# Patient Record
Sex: FEMALE | Race: WHITE | NOT HISPANIC OR LATINO
[De-identification: names, ages, dates, MRNs, and addresses within clinical notes are randomized per-mention and may not be internally consistent; named-entity substitution may affect disease eponyms.]

---

## 2021-05-26 ENCOUNTER — TRANSCRIPTION ENCOUNTER (OUTPATIENT)
Age: 46
End: 2021-05-26

## 2021-10-26 ENCOUNTER — APPOINTMENT (OUTPATIENT)
Dept: ORTHOPEDIC SURGERY | Facility: CLINIC | Age: 46
End: 2021-10-26
Payer: COMMERCIAL

## 2021-10-26 ENCOUNTER — TRANSCRIPTION ENCOUNTER (OUTPATIENT)
Age: 46
End: 2021-10-26

## 2021-10-26 VITALS
HEIGHT: 64 IN | BODY MASS INDEX: 21 KG/M2 | TEMPERATURE: 98.1 F | WEIGHT: 123 LBS | SYSTOLIC BLOOD PRESSURE: 122 MMHG | DIASTOLIC BLOOD PRESSURE: 79 MMHG | HEART RATE: 62 BPM

## 2021-10-26 DIAGNOSIS — M79.661 PAIN IN RIGHT LOWER LEG: ICD-10-CM

## 2021-10-26 DIAGNOSIS — S86.812A STRAIN OF OTHER MUSCLE(S) AND TENDON(S) AT LOWER LEG LEVEL, LEFT LEG, INITIAL ENCOUNTER: ICD-10-CM

## 2021-10-26 DIAGNOSIS — Z80.8 FAMILY HISTORY OF MALIGNANT NEOPLASM OF OTHER ORGANS OR SYSTEMS: ICD-10-CM

## 2021-10-26 DIAGNOSIS — Z78.9 OTHER SPECIFIED HEALTH STATUS: ICD-10-CM

## 2021-10-26 DIAGNOSIS — S86.811A STRAIN OF OTHER MUSCLE(S) AND TENDON(S) AT LOWER LEG LEVEL, RIGHT LEG, INITIAL ENCOUNTER: ICD-10-CM

## 2021-10-26 DIAGNOSIS — M79.662 PAIN IN RIGHT LOWER LEG: ICD-10-CM

## 2021-10-26 PROBLEM — Z00.00 ENCOUNTER FOR PREVENTIVE HEALTH EXAMINATION: Status: ACTIVE | Noted: 2021-10-26

## 2021-10-26 PROCEDURE — 73590 X-RAY EXAM OF LOWER LEG: CPT | Mod: 50

## 2021-10-26 PROCEDURE — 99204 OFFICE O/P NEW MOD 45 MIN: CPT

## 2021-10-26 RX ORDER — MELOXICAM 15 MG/1
15 TABLET ORAL
Qty: 30 | Refills: 2 | Status: ACTIVE | COMMUNITY
Start: 2021-10-26 | End: 1900-01-01

## 2021-10-26 NOTE — HISTORY OF PRESENT ILLNESS
[de-identified] : 10/26/2021: Patient is a 46 year-old female who presents to the office today for initial evaluation of bilateral calf pain. the pain began yesterday after a incident while wrestling. She immediately felt posterior calf pain on both calves after her wrestling mate landed on her legs. She has had significant posterior medial calf pain which has made things difficult with ambulation. The pain is throbbing in nature and radiates down the leg. She has tried icing and Aleve. The Aleve does give some relief, however, the icing is not helping. She does have a history of a spontaneous calf tear many years ago that was treated with RICE therapy. She presents today for further treatment options.

## 2021-10-26 NOTE — REASON FOR VISIT
[Initial Visit] : an initial visit for [Spouse] : spouse [FreeTextEntry2] : Bilateral Calf Pain none

## 2021-10-26 NOTE — ASSESSMENT
[FreeTextEntry1] : Patient with bilateral calf pain after a wrestling incident yesterday. Like to obtain an ultrasound of both calves to assess for either a gastrocnemius versus soleus injuries/tears. She will continue to ambulate as tolerated and continue with elevation, ice, and anti-inflammatories. I will also give the patient a prescription for physical therapy. The patient is in agreement with this plan.

## 2021-10-26 NOTE — HISTORY OF PRESENT ILLNESS
[de-identified] : 10/26/2021: Patient is a 46 year-old female who presents to the office today for initial evaluation of bilateral calf pain. the pain began yesterday after a incident while wrestling. She immediately felt posterior calf pain on both calves after her wrestling mate landed on her legs. She has had significant posterior medial calf pain which has made things difficult with ambulation. The pain is throbbing in nature and radiates down the leg. She has tried icing and Aleve. The Aleve does give some relief, however, the icing is not helping. She does have a history of a spontaneous calf tear many years ago that was treated with RICE therapy. She presents today for further treatment options.

## 2021-10-26 NOTE — PHYSICAL EXAM
[UE/LE] : Sensory: Intact in bilateral upper & lower extremities [ALL] : dorsalis pedis, posterior tibial, femoral, popliteal, and radial 2+ and symmetric bilaterally [Normal Finger/nose] : finger to nose coordination [Normal] : no peripheral adenopathy appreciated [Poor Appearance] : well-appearing [Acute Distress] : not in acute distress [de-identified] : Bilateral Lower Extremity Exam\par \par Skin is intact with no erythema or ecchymosis. There is no swelling or gross deformities. Range of motion of both knees and ankles is fully intact with flexion and extension of both knees, flexion and extension of both ankles, and inversion and eversion of both ankles. No joint tenderness of both knees, no patellar tenderness of both knees. No tenderness about the ankle joints bilaterally. No anterior tibial tenderness bilaterally. No tenderness to the proximal fibula bilaterally. Positive tenderness in the posterior medial aspect of the calves about the calves bilaterally. Distal Pulses are 2+ and lower extremity sensation is grossly intact bilaterally without any deficits.  [de-identified] : SYLR [de-identified] : 2 xray views of bilateral tibia/fibulas were obtained. No fractures or dislocations are noted.

## 2021-10-26 NOTE — PHYSICAL EXAM
[UE/LE] : Sensory: Intact in bilateral upper & lower extremities [ALL] : dorsalis pedis, posterior tibial, femoral, popliteal, and radial 2+ and symmetric bilaterally [Normal Finger/nose] : finger to nose coordination [Normal] : no peripheral adenopathy appreciated [Poor Appearance] : well-appearing [Acute Distress] : not in acute distress [de-identified] : Bilateral Lower Extremity Exam\par \par Skin is intact with no erythema or ecchymosis. There is no swelling or gross deformities. Range of motion of both knees and ankles is fully intact with flexion and extension of both knees, flexion and extension of both ankles, and inversion and eversion of both ankles. No joint tenderness of both knees, no patellar tenderness of both knees. No tenderness about the ankle joints bilaterally. No anterior tibial tenderness bilaterally. No tenderness to the proximal fibula bilaterally. Positive tenderness in the posterior medial aspect of the calves about the calves bilaterally. Distal Pulses are 2+ and lower extremity sensation is grossly intact bilaterally without any deficits.  [de-identified] : SYLR [de-identified] : 2 xray views of bilateral tibia/fibulas were obtained. No fractures or dislocations are noted.

## 2021-10-31 PROBLEM — S86.812A STRAIN OF LEFT CALF MUSCLE: Status: ACTIVE | Noted: 2021-10-26

## 2021-10-31 PROBLEM — S86.811A STRAIN OF RIGHT CALF MUSCLE: Status: ACTIVE | Noted: 2021-10-26

## 2022-10-05 ENCOUNTER — NON-APPOINTMENT (OUTPATIENT)
Age: 47
End: 2022-10-05

## 2023-03-30 ENCOUNTER — NON-APPOINTMENT (OUTPATIENT)
Age: 48
End: 2023-03-30

## 2023-03-31 ENCOUNTER — APPOINTMENT (OUTPATIENT)
Dept: ORTHOPEDIC SURGERY | Facility: CLINIC | Age: 48
End: 2023-03-31
Payer: COMMERCIAL

## 2023-03-31 VITALS
HEART RATE: 67 BPM | WEIGHT: 123 LBS | SYSTOLIC BLOOD PRESSURE: 121 MMHG | HEIGHT: 64 IN | BODY MASS INDEX: 21 KG/M2 | DIASTOLIC BLOOD PRESSURE: 82 MMHG

## 2023-03-31 DIAGNOSIS — M79.646 PAIN IN UNSPECIFIED HAND: ICD-10-CM

## 2023-03-31 DIAGNOSIS — M65.30 TRIGGER FINGER, UNSPECIFIED FINGER: ICD-10-CM

## 2023-03-31 DIAGNOSIS — M25.511 PAIN IN RIGHT SHOULDER: ICD-10-CM

## 2023-03-31 DIAGNOSIS — M79.643 PAIN IN UNSPECIFIED HAND: ICD-10-CM

## 2023-03-31 PROCEDURE — 99214 OFFICE O/P EST MOD 30 MIN: CPT | Mod: 25

## 2023-03-31 PROCEDURE — 20550 NJX 1 TENDON SHEATH/LIGAMENT: CPT | Mod: 59,F8

## 2023-03-31 PROCEDURE — 73130 X-RAY EXAM OF HAND: CPT | Mod: 50

## 2023-03-31 NOTE — PHYSICAL EXAM
[de-identified] : She is well-appearing on examination\par Examination of the [right] shoulder reveals equal active and passive motion as compared to the contralateral side\par There is a positive Speed, positive Mayo, positive Speed, tenderness with anterior shoulder compression\par Examination of the [right] hand particularly at the A1 of the ring reveals tenderness with a palpable click. \par  [de-identified] : [4] views of [bilateral hands and wrists] were obtained today in my office and were seen by me and discussed with the patient. 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

## 2023-03-31 NOTE — HISTORY OF PRESENT ILLNESS
[FreeTextEntry1] : Fanny is a pleasant 47-year-old female who presents today with multiple complaints that have been ongoing for the last year this includes right shoulder pain as well as right hand pain specifically the right ring finger.  She practices martial arts

## 2023-03-31 NOTE — ASSESSMENT
[FreeTextEntry1] : ASSESSMENT:\par \par The patient comes in today with chronic symptoms of right shoulder pain weakness and tendinopathy with more acute tenderness at the level of the bicipital groove.  At this point in time I do recommend physical therapy which has she has tried in the past as well as an injection today.\par For her right ring finger tendinopathy I do also recommend an injection today which should be of significant benefit\par [I have diagnosed the patient today with a new diagnosis - This may diminish bodily function for the extremity.] \par \par [For this I was able to review other physician’s note(s) including reviewing other tests, imaging results as well as personally view these results for my own interpretation.]\par \par Injection:\par \par The risks and benefits of a steroid injection were discussed in detail. The risks include but are not limited to: pain, infection, swelling, flare response, bleeding, subcutaneous fat atrophy, skin depigmentation and/or elevation of blood sugar. The risk of incomplete resolution of symptoms, recurrence and additional intervention was reviewed and considered by the patient. \par The patient agreed to proceed and under a sterile prep, I injected 1 unit into 2 cc of a combination of Celestone and Lidocaine into the right shoulder anterior bicipital groove sheath, right hand ring finger A1. The patient tolerated the injection well.\par \par The patient was adequately and thoroughly informed of my assessment of their current condition(s). \par \par DISCUSSION:\par 1.  I am hopeful that the injection to the right shoulder as well as the right ring finger will lead to improvement of her symptomatology\par 2.  She will commence physical therapy\par 3.  We will see each other again in 10 to 12 weeks if not sooner\par \par

## 2024-04-19 ENCOUNTER — OFFICE (OUTPATIENT)
Dept: URBAN - METROPOLITAN AREA CLINIC 116 | Facility: CLINIC | Age: 49
Setting detail: OPHTHALMOLOGY
End: 2024-04-19
Payer: COMMERCIAL

## 2024-04-19 DIAGNOSIS — H52.13: ICD-10-CM

## 2024-04-19 PROCEDURE — CLRNW CONTACT LENS RENEWAL- NO LENS CHANGE: Performed by: OPTOMETRIST
